# Patient Record
Sex: FEMALE | Race: WHITE | NOT HISPANIC OR LATINO | Employment: FULL TIME | URBAN - METROPOLITAN AREA
[De-identification: names, ages, dates, MRNs, and addresses within clinical notes are randomized per-mention and may not be internally consistent; named-entity substitution may affect disease eponyms.]

---

## 2020-03-24 ENCOUNTER — HOSPITAL ENCOUNTER (OUTPATIENT)
Facility: CLINIC | Age: 30
End: 2020-03-24
Admitting: OBSTETRICS & GYNECOLOGY
Payer: COMMERCIAL

## 2023-11-27 PROCEDURE — 87077 CULTURE AEROBIC IDENTIFY: CPT | Mod: ORL | Performed by: OTOLARYNGOLOGY

## 2023-11-28 ENCOUNTER — LAB REQUISITION (OUTPATIENT)
Dept: LAB | Facility: CLINIC | Age: 33
End: 2023-11-28
Payer: COMMERCIAL

## 2023-11-28 DIAGNOSIS — J32.0 CHRONIC MAXILLARY SINUSITIS: ICD-10-CM

## 2023-11-30 LAB — BACTERIA SPEC CULT: ABNORMAL

## 2024-05-20 ENCOUNTER — LAB REQUISITION (OUTPATIENT)
Dept: LAB | Facility: CLINIC | Age: 34
End: 2024-05-20
Payer: COMMERCIAL

## 2024-05-20 DIAGNOSIS — J32.0 CHRONIC MAXILLARY SINUSITIS: ICD-10-CM

## 2024-05-20 PROCEDURE — 87070 CULTURE OTHR SPECIMN AEROBIC: CPT | Mod: ORL | Performed by: OTOLARYNGOLOGY

## 2024-05-21 ENCOUNTER — MEDICAL CORRESPONDENCE (OUTPATIENT)
Dept: HEALTH INFORMATION MANAGEMENT | Facility: CLINIC | Age: 34
End: 2024-05-21
Payer: COMMERCIAL

## 2024-05-21 ENCOUNTER — TRANSFERRED RECORDS (OUTPATIENT)
Dept: HEALTH INFORMATION MANAGEMENT | Facility: CLINIC | Age: 34
End: 2024-05-21
Payer: COMMERCIAL

## 2024-05-21 DIAGNOSIS — Z82.79 FAMILY HISTORY OF PATENT FORAMEN OVALE: Primary | ICD-10-CM

## 2024-05-22 LAB — BACTERIA SPEC CULT: ABNORMAL

## 2024-06-05 ENCOUNTER — OFFICE VISIT (OUTPATIENT)
Dept: CARDIOLOGY | Facility: CLINIC | Age: 34
End: 2024-06-05
Attending: PHYSICIAN ASSISTANT
Payer: COMMERCIAL

## 2024-06-05 VITALS
HEIGHT: 68 IN | BODY MASS INDEX: 24.57 KG/M2 | DIASTOLIC BLOOD PRESSURE: 77 MMHG | HEART RATE: 74 BPM | OXYGEN SATURATION: 98 % | WEIGHT: 162.1 LBS | SYSTOLIC BLOOD PRESSURE: 117 MMHG

## 2024-06-05 DIAGNOSIS — Z82.79 FAMILY HISTORY OF BICUSPID AORTIC VALVE: Primary | ICD-10-CM

## 2024-06-05 DIAGNOSIS — Z82.79 FAMILY HISTORY OF PATENT FORAMEN OVALE: ICD-10-CM

## 2024-06-05 PROCEDURE — 99204 OFFICE O/P NEW MOD 45 MIN: CPT | Performed by: INTERNAL MEDICINE

## 2024-06-05 PROCEDURE — G2211 COMPLEX E/M VISIT ADD ON: HCPCS | Performed by: INTERNAL MEDICINE

## 2024-06-05 PROCEDURE — 93000 ELECTROCARDIOGRAM COMPLETE: CPT | Performed by: INTERNAL MEDICINE

## 2024-06-05 RX ORDER — VITAMIN B COMPLEX
1 TABLET ORAL DAILY
COMMUNITY

## 2024-06-05 RX ORDER — POLYETHYLENE GLYCOL 3350 17 G/17G
1 POWDER, FOR SOLUTION ORAL DAILY
COMMUNITY

## 2024-06-05 RX ORDER — PRENATAL VIT/IRON FUM/FOLIC AC 27MG-0.8MG
1 TABLET ORAL DAILY
COMMUNITY

## 2024-06-05 RX ORDER — AZELAIC ACID 0.15 G/G
GEL TOPICAL 2 TIMES DAILY
COMMUNITY
Start: 2023-12-21

## 2024-06-05 RX ORDER — FLUTICASONE PROPIONATE 50 MCG
1 SPRAY, SUSPENSION (ML) NASAL
COMMUNITY

## 2024-06-05 RX ORDER — AZELASTINE HYDROCHLORIDE 137 UG/1
SPRAY, METERED NASAL
COMMUNITY
Start: 2023-11-17 | End: 2024-06-05

## 2024-06-05 RX ORDER — LORATADINE 10 MG/1
10 TABLET ORAL DAILY
COMMUNITY

## 2024-06-05 NOTE — PROGRESS NOTES
HPI and Plan:   Lucila is a very nice 33-year-old woman who is 10 weeks pregnant with her third pregnancy and recently found out her mother has coronary disease, a PFO and a bicuspid aortic valve.    Lucila states at the end of her second pregnancy her blood pressure went up but she was not preeclamptic.  Otherwise she had no problems with her pregnancies.  She states she is not active and does not exercise regularly but notes no limitations to her activity.  She has had no symptoms to suggest a TIA.  She has no palpitations, lightheadedness, dizziness, syncope or near syncope.    She is a lifelong non-smoker.  She when not pregnant she occasionally drinks alcohol.  She does not have diabetes mellitus or hypertension.  She does have mild hyperlipidemia with a fasting lipid profile from April of this year showing total cholesterol 201 with an HDL of 45 and LDL of 135 and triglycerides of 105.  She was not nursing and had just become pregnant.    EKG today demonstrates normal sinus rhythm and inverted precordial T waves consistent with her age.    Assessment and plan.  Lucila does not appear to have any significant murmur but does need to be evaluated for the possibility of a PFO and bicuspid aortic valve.  I will schedule an echocardiogram.  I have also instructed her that her sibs should also have echocardiograms.    Regarding her cholesterol and family history of coronary disease we talked about the importance of regular exercise, predominately plant-based diet and maintaining ideal body weight.  I have suggested when she turns 38 she should get a calcium score.    Further evaluation treatment will will depend on the above results. Thank you for allow me to participate in this patient's care.  Sincerely,                               Edilberto Monk MD [facility]    The longitudinal plan of care for the diagnosis(es)/condition(s) as documented were addressed during this visit. Due to the added complexity in care, I  will continue to support Lucila in the subsequent management and with ongoing continuity of care.        Today's clinic visit entailed:  Review of the result(s) of each unique test - EKG, lab work  Ordering of each unique test  Prescription drug management  32  Provider  Link to [facility] Help Grid           Orders Placed This Encounter   Procedures    EKG 12-lead complete w/read - Clinics (performed today)    Echocardiogram Complete       Orders Placed This Encounter   Medications    azelaic acid (FINACIA) 15 % external gel     Sig: Apply topically 2 times daily    DISCONTD: Azelastine HCl 137 MCG/SPRAY SOLN     Sig: SPRAY 1-2 SPRAYS INTO BOTH NOSTRILS TWICE A DAY AS DIRECTED    Vitamin D3 (CHOLECALCIFEROL) 25 mcg (1000 units) tablet     Sig: Take 1 tablet by mouth daily    fluticasone (FLONASE) 50 MCG/ACT nasal spray     Sig: Spray 1 spray into both nostrils    Prenatal Vit-Fe Fumarate-FA (PRENATAL MULTIVITAMIN W/IRON) 27-0.8 MG tablet     Sig: Take 1 tablet by mouth daily    polyethylene glycol (MIRALAX) 17 g packet     Sig: Take 1 packet by mouth daily    loratadine (CLARITIN) 10 MG tablet     Sig: Take 10 mg by mouth daily       Medications Discontinued During This Encounter   Medication Reason    Azelastine HCl 137 MCG/SPRAY SOLN          Encounter Diagnoses   Name Primary?    Family history of patent foramen ovale     Family history of bicuspid aortic valve Yes       CURRENT MEDICATIONS:  Current Outpatient Medications   Medication Sig Dispense Refill    azelaic acid (FINACIA) 15 % external gel Apply topically 2 times daily      fluticasone (FLONASE) 50 MCG/ACT nasal spray Spray 1 spray into both nostrils      loratadine (CLARITIN) 10 MG tablet Take 10 mg by mouth daily      polyethylene glycol (MIRALAX) 17 g packet Take 1 packet by mouth daily      Prenatal Vit-Fe Fumarate-FA (PRENATAL MULTIVITAMIN W/IRON) 27-0.8 MG tablet Take 1 tablet by mouth daily      Vitamin D3 (CHOLECALCIFEROL) 25 mcg (1000 units) tablet  "Take 1 tablet by mouth daily         ALLERGIES   Not on File    PAST MEDICAL HISTORY:  No past medical history on file.    PAST SURGICAL HISTORY:  No past surgical history on file.    FAMILY HISTORY:  No family history on file.    SOCIAL HISTORY:  Social History     Socioeconomic History    Marital status:      Social Determinants of Health     Financial Resource Strain: Not At Risk (1/30/2024)    Received from [employer]    Financial Resource Strain     Is it hard for you to pay for the very basics like food, housing, medical care or heating?: No   Food Insecurity: Not At Risk (1/30/2024)    Received from [employer]    Food Insecurity     Does your food run out before you have the money to buy more?: No   Transportation Needs: Not At Risk (1/30/2024)    Received from [employer]    Transportation Needs     Does a lack of transportation keep you from your medical appointments or from getting your medications?: No    Received from [facility]    Social Connections       Review of Systems:  Skin:  not assessed       Eyes:  not assessed      ENT:  Positive for postnasal drainage    Respiratory:  Positive for dyspnea on exertion SCRUGGS on stairs - 10 weeks pregnant.   Cardiovascular:  Negative for;palpitations;chest pain;syncope or near-syncope;edema Positive for;lightheadedness;dizziness;fatigue Occasionally when BP is lower than usual. Exhausted due to pregnancy - overall frustrated that she was fatigued aside from being pregnant.  Gastroenterology: Negative for nausea;vomiting;diarrhea    Genitourinary:  not assessed      Musculoskeletal:  not assessed      Neurologic:  Positive for headaches occasional  Psychiatric:  Negative for excessive stress;anxiety;sleep disturbances    Heme/Lymph/Imm:  not assessed      Endocrine:  Negative        Physical Exam:  Vitals: /77   Pulse 74   Ht 1.715 m (5' 7.5\")   Wt 73.5 kg (162 lb 1.6 oz)   SpO2 98%   BMI 25.01 kg/m  "     Constitutional:  cooperative, alert and oriented, well developed, well nourished, in no acute distress        Skin:  warm and dry to the touch, no apparent skin lesions or masses noted          Head:  normocephalic, no masses or lesions        Eyes:  pupils equal and round, conjunctivae and lids unremarkable, sclera white, no xanthalasma, EOMS intact, no nystagmus        Lymph:      ENT:  no pallor or cyanosis, dentition good        Neck:  no carotid bruit        Respiratory:  normal breath sounds, clear to auscultation, normal A-P diameter, normal symmetry, normal respiratory excursion, no use of accessory muscles         Cardiac: regular rhythm;no murmurs, gallops or rubs detected                pulses full and equal                                        GI:           Extremities and Muscular Skeletal:  no edema;no spinal abnormalities noted;normal muscle strength and tone              Neurological:  no gross motor deficits        Psych:  affect appropriate, oriented to time, person and place        CC  Gila Elizabeth PA-C  [address]  [place] [address]

## 2024-06-05 NOTE — LETTER
6/5/2024    Rosieignacio Finney, APRN CNP  [address] [facility] [address]    RE: Lucila Dimas       Dear Colleague,     I had the pleasure of seeing Lucila Dimas in the [facility].  HPI and Plan:   Lucila is a very nice 33-year-old woman who is 10 weeks pregnant with her third pregnancy and recently found out her mother has coronary disease, a PFO and a bicuspid aortic valve.    Lucila states at the end of her second pregnancy her blood pressure went up but she was not preeclamptic.  Otherwise she had no problems with her pregnancies.  She states she is not active and does not exercise regularly but notes no limitations to her activity.  She has had no symptoms to suggest a TIA.  She has no palpitations, lightheadedness, dizziness, syncope or near syncope.    She is a lifelong non-smoker.  She when not pregnant she occasionally drinks alcohol.  She does not have diabetes mellitus or hypertension.  She does have mild hyperlipidemia with a fasting lipid profile from April of this year showing total cholesterol 201 with an HDL of 45 and LDL of 135 and triglycerides of 105.  She was not nursing and had just become pregnant.    EKG today demonstrates normal sinus rhythm and inverted precordial T waves consistent with her age.    Assessment and plan.  Lucila does not appear to have any significant murmur but does need to be evaluated for the possibility of a PFO and bicuspid aortic valve.  I will schedule an echocardiogram.  I have also instructed her that her sibs should also have echocardiograms.    Regarding her cholesterol and family history of coronary disease we talked about the importance of regular exercise, predominately plant-based diet and maintaining ideal body weight.  I have suggested when she turns 38 she should get a calcium score.    Further evaluation treatment will will depend on the above results. Thank you for allow me to participate in this patient's care.   Sincerely,                               Edilberto Monk MD [facility]    The longitudinal plan of care for the diagnosis(es)/condition(s) as documented were addressed during this visit. Due to the added complexity in care, I will continue to support Lucila in the subsequent management and with ongoing continuity of care.        Today's clinic visit entailed:  Review of the result(s) of each unique test - EKG, lab work  Ordering of each unique test  Prescription drug management  32  Provider  Link to [place] [facility] Help Grid           Orders Placed This Encounter   Procedures    EKG 12-lead complete w/read - Clinics (performed today)    Echocardiogram Complete       Orders Placed This Encounter   Medications    azelaic acid (FINACIA) 15 % external gel     Sig: Apply topically 2 times daily    DISCONTD: Azelastine HCl 137 MCG/SPRAY SOLN     Sig: SPRAY 1-2 SPRAYS INTO BOTH NOSTRILS TWICE A DAY AS DIRECTED    Vitamin D3 (CHOLECALCIFEROL) 25 mcg (1000 units) tablet     Sig: Take 1 tablet by mouth daily    fluticasone (FLONASE) 50 MCG/ACT nasal spray     Sig: Spray 1 spray into both nostrils    Prenatal Vit-Fe Fumarate-FA (PRENATAL MULTIVITAMIN W/IRON) 27-0.8 MG tablet     Sig: Take 1 tablet by mouth daily    polyethylene glycol (MIRALAX) 17 g packet     Sig: Take 1 packet by mouth daily    loratadine (CLARITIN) 10 MG tablet     Sig: Take 10 mg by mouth daily       Medications Discontinued During This Encounter   Medication Reason    Azelastine HCl 137 MCG/SPRAY SOLN          Encounter Diagnoses   Name Primary?    Family history of patent foramen ovale     Family history of bicuspid aortic valve Yes       CURRENT MEDICATIONS:  Current Outpatient Medications   Medication Sig Dispense Refill    azelaic acid (FINACIA) 15 % external gel Apply topically 2 times daily      fluticasone (FLONASE) 50 MCG/ACT nasal spray Spray 1 spray into both nostrils      loratadine (CLARITIN) 10 MG tablet Take 10 mg by mouth daily      polyethylene glycol  (MIRALAX) 17 g packet Take 1 packet by mouth daily      Prenatal Vit-Fe Fumarate-FA (PRENATAL MULTIVITAMIN W/IRON) 27-0.8 MG tablet Take 1 tablet by mouth daily      Vitamin D3 (CHOLECALCIFEROL) 25 mcg (1000 units) tablet Take 1 tablet by mouth daily         ALLERGIES   Not on File    PAST MEDICAL HISTORY:  No past medical history on file.    PAST SURGICAL HISTORY:  No past surgical history on file.    FAMILY HISTORY:  No family history on file.    SOCIAL HISTORY:  Social History     Socioeconomic History    Marital status:      Social Determinants of Health     Financial Resource Strain: Not At Risk (1/30/2024)    Received from [employer]    Financial Resource Strain     Is it hard for you to pay for the very basics like food, housing, medical care or heating?: No   Food Insecurity: Not At Risk (1/30/2024)    Received from [employer]    Food Insecurity     Does your food run out before you have the money to buy more?: No   Transportation Needs: Not At Risk (1/30/2024)    Received from [employer]    Transportation Needs     Does a lack of transportation keep you from your medical appointments or from getting your medications?: No    Received from [facility]    Social Connections       Review of Systems:  Skin:  not assessed       Eyes:  not assessed      ENT:  Positive for postnasal drainage    Respiratory:  Positive for dyspnea on exertion SCRUGGS on stairs - 10 weeks pregnant.   Cardiovascular:  Negative for;palpitations;chest pain;syncope or near-syncope;edema Positive for;lightheadedness;dizziness;fatigue Occasionally when BP is lower than usual. Exhausted due to pregnancy - overall frustrated that she was fatigued aside from being pregnant.  Gastroenterology: Negative for nausea;vomiting;diarrhea    Genitourinary:  not assessed      Musculoskeletal:  not assessed      Neurologic:  Positive for headaches occasional  Psychiatric:  Negative for excessive  "stress;anxiety;sleep disturbances    Heme/Lymph/Imm:  not assessed      Endocrine:  Negative        Physical Exam:  Vitals: /77   Pulse 74   Ht 1.715 m (5' 7.5\")   Wt 73.5 kg (162 lb 1.6 oz)   SpO2 98%   BMI 25.01 kg/m      Constitutional:  cooperative, alert and oriented, well developed, well nourished, in no acute distress        Skin:  warm and dry to the touch, no apparent skin lesions or masses noted          Head:  normocephalic, no masses or lesions        Eyes:  pupils equal and round, conjunctivae and lids unremarkable, sclera white, no xanthalasma, EOMS intact, no nystagmus        Lymph:      ENT:  no pallor or cyanosis, dentition good        Neck:  no carotid bruit        Respiratory:  normal breath sounds, clear to auscultation, normal A-P diameter, normal symmetry, normal respiratory excursion, no use of accessory muscles         Cardiac: regular rhythm;no murmurs, gallops or rubs detected                pulses full and equal                                        GI:           Extremities and Muscular Skeletal:  no edema;no spinal abnormalities noted;normal muscle strength and tone              Neurological:  no gross motor deficits        Psych:  affect appropriate, oriented to time, person and place        CC  Gila Elizabeth PA-C  [address]  [place] [address]      Thank you for allowing me to participate in the care of your patient.      Sincerely,     Edilberto Monk MD     [facility]  "

## 2024-06-13 ENCOUNTER — HOSPITAL ENCOUNTER (OUTPATIENT)
Dept: CARDIOLOGY | Facility: CLINIC | Age: 34
Discharge: HOME | End: 2024-06-13
Attending: INTERNAL MEDICINE | Admitting: INTERNAL MEDICINE
Payer: COMMERCIAL

## 2024-06-13 ENCOUNTER — TELEPHONE (OUTPATIENT)
Dept: CARDIOLOGY | Facility: CLINIC | Age: 34
End: 2024-06-13

## 2024-06-13 DIAGNOSIS — Z82.79 FAMILY HISTORY OF PATENT FORAMEN OVALE: ICD-10-CM

## 2024-06-13 DIAGNOSIS — Z82.79 FAMILY HISTORY OF BICUSPID AORTIC VALVE: ICD-10-CM

## 2024-06-13 LAB — LVEF ECHO: NORMAL

## 2024-06-13 PROCEDURE — 93306 TTE W/DOPPLER COMPLETE: CPT

## 2024-06-13 PROCEDURE — 93306 TTE W/DOPPLER COMPLETE: CPT | Mod: 26 | Performed by: INTERNAL MEDICINE

## 2024-06-13 NOTE — TELEPHONE ENCOUNTER
Edilberto Monk MD Anderson, Fay MARCELINO RN56 minutes ago (2:53 PM)     Very normal-appearing echocardiogram.  Reassure patient       Spoke to patient, reviewed normal echo and message from Dr Monk. She was very appreciative for the quick reply. Patient to see cardiology PRN.

## 2024-06-13 NOTE — TELEPHONE ENCOUNTER
Echo 6/13/2024 noted. Ordered for family history.    Per Dr. Monk's dictation 6/5/2024:  Lucila does not appear to have any significant murmur but does need to be evaluated for the possibility of a PFO and bicuspid aortic valve.     Echo:  The left ventricle is normal in size.  Left ventricular systolic function is normal.  The visual ejection fraction is 60-65%.  Diastolic Doppler findings (E/E' ratio and/or other parameters) suggest left ventricular filling pressures are normal.  The right ventricle is normal in structure, function and size.  There is no atrial shunt seen.  Normal tricuspid aortic valve  Normal transthoracic echocardiogram. There is no comparison study available.    No orders to return    Will message Dr. Monk to review

## 2025-03-15 ENCOUNTER — HEALTH MAINTENANCE LETTER (OUTPATIENT)
Age: 35
End: 2025-03-15